# Patient Record
Sex: MALE | Race: BLACK OR AFRICAN AMERICAN | Employment: OTHER | URBAN - METROPOLITAN AREA
[De-identification: names, ages, dates, MRNs, and addresses within clinical notes are randomized per-mention and may not be internally consistent; named-entity substitution may affect disease eponyms.]

---

## 2022-12-08 ENCOUNTER — HOSPITAL ENCOUNTER (EMERGENCY)
Age: 42
Discharge: HOME | End: 2022-12-08
Attending: EMERGENCY MEDICINE
Payer: OTHER GOVERNMENT

## 2022-12-08 ENCOUNTER — APPOINTMENT (OUTPATIENT)
Dept: GENERAL RADIOLOGY | Age: 42
End: 2022-12-08
Attending: EMERGENCY MEDICINE
Payer: OTHER GOVERNMENT

## 2022-12-08 VITALS
OXYGEN SATURATION: 98 % | DIASTOLIC BLOOD PRESSURE: 72 MMHG | HEIGHT: 67 IN | WEIGHT: 182 LBS | BODY MASS INDEX: 28.56 KG/M2 | HEART RATE: 80 BPM | SYSTOLIC BLOOD PRESSURE: 103 MMHG | RESPIRATION RATE: 19 BRPM | TEMPERATURE: 99 F

## 2022-12-08 DIAGNOSIS — B34.9 VIRAL SYNDROME: Primary | ICD-10-CM

## 2022-12-08 DIAGNOSIS — M94.0 COSTOCHONDRITIS: ICD-10-CM

## 2022-12-08 PROCEDURE — 93005 ELECTROCARDIOGRAM TRACING: CPT

## 2022-12-08 PROCEDURE — 99284 EMERGENCY DEPT VISIT MOD MDM: CPT

## 2022-12-08 PROCEDURE — 71045 X-RAY EXAM CHEST 1 VIEW: CPT

## 2022-12-08 NOTE — DISCHARGE INSTRUCTIONS
Thank you! Thank you for allowing me to care for you in the emergency department. It is my goal to provide you with excellent care. If you have not received excellent quality care, please ask to speak to the nurse manager. Please fill out the survey that will come to you by mail or email since we listen to your feedback! Below you will find a list of your tests from today's visit. Should you have any questions, please do not hesitate to call the emergency department. Labs  No results found for this or any previous visit (from the past 12 hour(s)). Radiologic Studies  XR CHEST PORT   Final Result   No acute cardiopulmonary disease. CT Results  (Last 48 hours)      None          CXR Results  (Last 48 hours)                 12/08/22 1055  XR CHEST PORT Final result    Impression:  No acute cardiopulmonary disease. Narrative:  INDICATION: Cough       Portable AP view of the chest.       There is no prior study for direct comparison. Cardiomediastinal silhouette is within normal limits. Lungs are clear   bilaterally. Pleural spaces are normal and there is no pneumothorax. Osseous   structures are intact.                 ------------------------------------------------------------------------------------------------------------  The exam and treatment you received in the Emergency Department were for an urgent problem and are not intended as complete care. It is important that you follow-up with a doctor, nurse practitioner, or physician assistant to:  (1) confirm your diagnosis,  (2) re-evaluation of changes in your illness and treatment, and  (3) for ongoing care. Please take your discharge instructions with you when you go to your follow-up appointment. If you have any problem arranging a follow-up appointment, contact the Emergency Department.   If your symptoms become worse or you do not improve as expected and you are unable to reach your health care provider, please return to the Emergency Department. We are available 24 hours a day. If a prescription has been provided, please have it filled as soon as possible to prevent a delay in treatment. If you have any questions or reservations about taking the medication due to side effects or interactions with other medications, please call your primary care provider or contact the ER.

## 2022-12-08 NOTE — ED PROVIDER NOTES
EMERGENCY DEPARTMENT HISTORY AND PHYSICAL EXAM      Date: 12/8/2022  Patient Name: Gisele Yip    History of Presenting Illness     Chief Complaint   Patient presents with    Cough    Chest Pain    Sore Throat       History Provided By: Patient    HPI: Gisele Yip, 38 y.o. male presents to the ED with CC of cough, sore throat, chest pain. Patient reports that his wife and 2 daughters at home sick with similar symptoms. Patient reports he feels like his symptoms are worse than anyone else is. States that chest pain is made worse with coughing and arm movement as well as palpation at the costochondral junction. Has not taken anything for symptoms prior to arrival.       Patient denies SOB, chest pain, or any neurological symptoms. There are no other complaints, changes, or physical findings at this time. Past History     Past Medical History:  No past medical history on file. Allergies:  Not on File    Review of Systems   Vital signs and nursing notes reviewed  Review of Systems   Constitutional:  Negative for chills and fever. HENT:  Positive for congestion and sore throat. Negative for rhinorrhea. Eyes:  Negative for photophobia and visual disturbance. Respiratory:  Positive for cough. Negative for shortness of breath. Cardiovascular:  Positive for chest pain. Negative for palpitations. Gastrointestinal:  Negative for abdominal pain, diarrhea, nausea and vomiting. Genitourinary:  Negative for difficulty urinating and dysuria. Musculoskeletal:  Negative for arthralgias and myalgias. Skin:  Negative for color change and rash. Neurological:  Negative for weakness and headaches. Psychiatric/Behavioral:  Negative for dysphoric mood and sleep disturbance.         Physical Exam   Visit Vitals  /72 (BP 1 Location: Right upper arm, BP Patient Position: At rest)   Pulse 80   Temp 99 °F (37.2 °C)   Resp 19   Ht 5' 7\" (1.702 m)   Wt 82.6 kg (182 lb)   SpO2 98%   BMI 28.51 kg/m² CONSTITUTIONAL: Alert, in no distress. Appears stated age. HEAD:  Normocephalic, atraumatic  EYES: EOM intact. No conjunctival injection or scleral icterus  Neck:  Supple. No meningismus  RESP: Normal with no work of breathing, speaking in full sentences. CHEST: Chest wall tenderness to palpation  CV: Well perfused. NEURO: Alert with normal mentation, moving extremities spontaneously  PSYCH: Normal mood, normal affect      Medical Decision Making   Patient presents with normal oxygen saturation and mild URI symptoms. Viral testing was not conducted. The patient was given quarantine/isolation recommendations and agrees with the plan to be discharged home. They were provided instructions to return for difficulty breathing, chest pain, altered mentation, or any other new or worsening symptoms. Chest x-ray demonstrating no acute abnormality. Will discharge home with symptomatic treatment. Suspect viral URI precipitating costochondritis    ED Course:   Initial assessment performed. The patients presenting problems have been discussed, and they are in agreement with the care plan formulated and outlined with them. I have encouraged them to ask questions as they arise throughout their visit. Critical Care Time: None    Disposition:  DISCHARGE NOTE:  The pt is ready for discharge. The pt's signs, symptoms, diagnosis, and discharge instructions have been discussed and pt has conveyed their understanding. The pt is to follow up as recommended or return to ER should their symptoms worsen. Plan has been discussed and pt is in agreement. PLAN:  1. There are no discharge medications for this patient. 2.   Follow-up Information    None       4. Take Tylenol or Ibuprofen as needed  5. Drink plenty of fluids  6. Return to ED if worse especially if any shortness of breath, chest pain or altered mentation. Diagnosis     Clinical Impression:   1. Viral syndrome    2.  Costochondritis        Please note that this dictation was completed with [employer], the computer voice recognition software. Quite often unanticipated grammatical, syntax, homophones, and other interpretive errors are inadvertently transcribed by the computer software. Please disregards these errors. Please excuse any errors that have escaped final proofreading.

## 2022-12-08 NOTE — Clinical Note
[facility] EMERGENCY DEPT  [address] [place] [id number]  [phone]    Work/School Note    Date: 12/8/2022    To Whom It May concern:    Harvey Martinez was seen and treated today in the emergency room by the following provider(s):  Attending Provider: Yaneli Keita is excused from work/school on 12/08/22 and 12/09/22. He is medically clear to return to work/school on 12/10/2022.        Sincerely,          Wilberto Mc DO

## 2022-12-09 LAB
ATRIAL RATE: 77 BPM
CALCULATED P AXIS, ECG09: 70 DEGREES
CALCULATED R AXIS, ECG10: 59 DEGREES
CALCULATED T AXIS, ECG11: 41 DEGREES
DIAGNOSIS, 93000: NORMAL
P-R INTERVAL, ECG05: 146 MS
Q-T INTERVAL, ECG07: 350 MS
QRS DURATION, ECG06: 82 MS
QTC CALCULATION (BEZET), ECG08: 396 MS
VENTRICULAR RATE, ECG03: 77 BPM